# Patient Record
Sex: FEMALE | HISPANIC OR LATINO | Employment: UNEMPLOYED | URBAN - METROPOLITAN AREA
[De-identification: names, ages, dates, MRNs, and addresses within clinical notes are randomized per-mention and may not be internally consistent; named-entity substitution may affect disease eponyms.]

---

## 2024-07-09 ENCOUNTER — MEDICAL CORRESPONDENCE (OUTPATIENT)
Dept: HEALTH INFORMATION MANAGEMENT | Facility: CLINIC | Age: 24
End: 2024-07-09
Payer: MEDICAID

## 2024-07-16 DIAGNOSIS — Z32.01 PREGNANCY TEST POSITIVE: Primary | ICD-10-CM

## 2024-07-17 ENCOUNTER — ANCILLARY PROCEDURE (OUTPATIENT)
Dept: ULTRASOUND IMAGING | Facility: CLINIC | Age: 24
End: 2024-07-17
Attending: MIDWIFE
Payer: MEDICAID

## 2024-07-17 DIAGNOSIS — Z32.01 PREGNANCY TEST POSITIVE: ICD-10-CM

## 2024-07-17 PROCEDURE — 76801 OB US < 14 WKS SINGLE FETUS: CPT | Performed by: RADIOLOGY

## 2024-07-17 PROCEDURE — T1013 SIGN LANG/ORAL INTERPRETER: HCPCS | Mod: U4

## 2024-07-17 NOTE — NURSING NOTE
Due to patient being non-English speaking/uses sign language, an  was used for this visit. Only for face-to-face interpretation by an external agency, date and length of interpretation can be found on the scanned worksheet.     name: Heike  Agency:  [employer]  Language: Armenian   Telephone number: [phone]  Type of interpretation: Telephone, spoken

## 2024-09-12 ENCOUNTER — HOSPITAL ENCOUNTER (OUTPATIENT)
Dept: ULTRASOUND IMAGING | Facility: CLINIC | Age: 24
Discharge: HOME | End: 2024-09-12
Attending: FAMILY MEDICINE | Admitting: FAMILY MEDICINE
Payer: MEDICAID

## 2024-09-12 DIAGNOSIS — Z34.82 SUPERVISION OF NORMAL INTRAUTERINE PREGNANCY IN MULTIGRAVIDA, SECOND TRIMESTER: ICD-10-CM

## 2024-09-12 PROCEDURE — 76805 OB US >/= 14 WKS SNGL FETUS: CPT

## 2024-09-12 PROCEDURE — 76805 OB US >/= 14 WKS SNGL FETUS: CPT | Mod: 26 | Performed by: RADIOLOGY

## 2024-12-30 ENCOUNTER — HOSPITAL ENCOUNTER (OUTPATIENT)
Facility: CLINIC | Age: 24
Discharge: HOME | End: 2024-12-30
Attending: NURSE PRACTITIONER | Admitting: NURSE PRACTITIONER
Payer: COMMERCIAL

## 2024-12-30 VITALS
SYSTOLIC BLOOD PRESSURE: 106 MMHG | HEART RATE: 81 BPM | RESPIRATION RATE: 18 BRPM | HEIGHT: 58 IN | BODY MASS INDEX: 32.12 KG/M2 | DIASTOLIC BLOOD PRESSURE: 69 MMHG | TEMPERATURE: 98.4 F | WEIGHT: 153 LBS

## 2024-12-30 PROBLEM — Z36.89 ENCOUNTER FOR TRIAGE IN PREGNANT PATIENT: Status: ACTIVE | Noted: 2024-12-30

## 2024-12-30 PROCEDURE — 59025 FETAL NON-STRESS TEST: CPT | Mod: 26 | Performed by: NURSE PRACTITIONER

## 2024-12-30 PROCEDURE — 99214 OFFICE O/P EST MOD 30 MIN: CPT | Mod: 25 | Performed by: NURSE PRACTITIONER

## 2024-12-30 PROCEDURE — G0463 HOSPITAL OUTPT CLINIC VISIT: HCPCS

## 2024-12-30 RX ORDER — LIDOCAINE 40 MG/G
CREAM TOPICAL
Status: DISCONTINUED | OUTPATIENT
Start: 2024-12-30 | End: 2024-12-30 | Stop reason: HOSPADM

## 2024-12-30 RX ORDER — FERROUS GLUCONATE 324(38)MG
324 TABLET ORAL
COMMUNITY

## 2024-12-30 ASSESSMENT — ACTIVITIES OF DAILY LIVING (ADL)
ADLS_ACUITY_SCORE: 15

## 2024-12-30 NOTE — PROGRESS NOTES
HOSPITAL TRIAGE NOTE  ===================    CHIEF COMPLAINT  ========================  Elzbieta Gooden is a 24 year old patient presenting today at 37w1d for evaluation of uterine contractions.    Patient's last menstrual period was 2024.  Estimated Date of Delivery: 2025       HPI  ==================   Elzbieta started contractions this morning. She lives 10 minutes away and plans an unmedicated birth. Has had prenatal care through [facility].   Prenatal record and labs reviewed from [facility], through [employer] EMR.    CONTRACTIONS: every 6 minutes  ABDOMINAL PAIN: none and mild  FETAL MOVEMENT: active    VAGINAL BLEEDING: none  RUPTURE OF MEMBRANES: no  PELVIC PAIN: none    PREGNANCY COMPLICATIONS: none      # Pain Assessment:      2024     8:55 AM   Current Pain Score   Patient currently in pain? yes   - Elzbieta is experiencing pain due to contractions. Pain management was discussed and the plan was created in a collaborative fashion.  Elzbieta's response to the current recommendations: engaged  - expectant managment      REVIEW OF SYSTEMS  =====================  C: NEGATIVE for fever, chills  I: NEGATIVE for worrisome rashes, moles or lesions  E: NEGATIVE for vision changes or irritation  R: NEGATIVE for significant cough or SOB  CV: NEGATIVE for chest pain, palpitations or varicosities  GI: NEGATIVE for nausea, abdominal pain, heartburn, or change in bowel habits  : NEGATIVE for frequency, dysuria, or hematuria  M: NEGATIVE for significant arthralgias or myalgia  N: NEGATIVE for headache, weakness, dizziness or paresthesias  P: NEGATIVE for changes in mood or affect    PROBLEM LIST  ===============  Patient Active Problem List    Diagnosis Date Noted    Encounter for triage in pregnant patient 2024     Priority: Medium       HISTORIES  ==============  ALLERGIES:    No Known Allergies  PAST MEDICAL HISTORY  History reviewed. No pertinent past medical  history.  SOCIAL HISTORY  Social History     Socioeconomic History    Marital status:      Spouse name: Not on file    Number of children: Not on file    Years of education: Not on file    Highest education level: Not on file   Occupational History    Not on file   Tobacco Use    Smoking status: Never    Smokeless tobacco: Never   Substance and Sexual Activity    Alcohol use: Never    Drug use: Never    Sexual activity: Yes   Other Topics Concern    Not on file   Social History Narrative    Not on file     Social Drivers of Health     Financial Resource Strain: Low Risk  (2024)    Financial Resource Strain     Within the past 12 months, have you or your family members you live with been unable to get utilities (heat, electricity) when it was really needed?: No   Food Insecurity: Low Risk  (2024)    Food Insecurity     Within the past 12 months, did you worry that your food would run out before you got money to buy more?: No     Within the past 12 months, did the food you bought just not last and you didn t have money to get more?: No   Transportation Needs: Low Risk  (2024)    Transportation Needs     Within the past 12 months, has lack of transportation kept you from medical appointments, getting your medicines, non-medical meetings or appointments, work, or from getting things that you need?: No   Physical Activity: Not on File (2024)    Received from [facility]    Physical Activity     Physical Activity: 0   Stress: Not on File (2024)    Received from [facility]    Stress     Stress: 0   Social Connections: Not on File (2024)    Received from [facility]    Social Connections     Connectedness: 0   Interpersonal Safety: Not on file   Housing Stability: Low Risk  (2024)    Housing Stability     Do you have housing? : Yes     Are you worried about losing your housing?: No       FAMILY HISTORY  History reviewed. No pertinent family history.  OB HISTORY  OB History    Para Term  " AB Living   2 1 1 0 0 1   SAB IAB Ectopic Multiple Live Births   0 0 0 0 1      # Outcome Date GA Lbr Abbe/2nd Weight Sex Type Anes PTL Lv   2 Current            1 Term 18     Vag-Spont   TC     Prenatal Labs: O pos, antibody neg, Hep B neg, Hep C neg, HIV NR, RPR NR  Rubella- immune  Varicella immune    ULTRASOUND(s) reviewed: 24 21w4d, normal survey.     EXAM  ============  /69 (BP Location: Right arm)   Pulse 81   Temp 98.4  F (36.9  C) (Oral)   Resp 18   Ht 1.47 m (4' 9.87\")   Wt 69.4 kg (153 lb)   LMP 2024   BMI 32.12 kg/m    GENERAL APPEARANCE: healthy, alert and no distress  RESP: lungs clear to auscultation - no rales, rhonchi or wheezes  CV: regular rates and rhythm, normal S1 S2, no S3 or S4 and no murmur,and no varicosities  ABDOMEN:  soft, nontender, no epigastric pain  SKIN: no suspicious lesions or rashes  NEURO: Denies headache, blurred vision, other vision changes  PSYCH: mentation appears normal. and affect normal/bright  MS/ LEGS: Reflexes normal bilaterally    CONTRACTIONS: every 6-8 minutes   FETAL HEART TONES: continuous EFM- baseline 135 with moderate variability and positive accelerations. No decelerations.  NST: REACTIVE  EFW:7#    PELVIC EXAM: 1/ 50%/ Posterior/ med/ -3  boggs = 3  PRESENTATION: VERTEX by bsus  BLOOD: no  DISCHARGE: none    ROM: no      DIAGNOSIS  ============  37w1d seen on the Birthplace Triage for labor evaluation- prodromal labor    NST: REACTIVE  Fetal Heart rate tracing:category one    PLAN  ============  See and evaluated on L&D. No cervical change over 2 hours.   Discharge to home with labor instuctions per discharge instruction form  Call or return to the Birthplace with contractions, cramping, abdominal or pelvic pain, vaginal bleeding, leaking fluid or decreased fetal movement.  Follow up- as previously scheduled    FREDI SALEHM    "

## 2024-12-30 NOTE — PROGRESS NOTES
Data: Patient assessed in the Birthplace for uterine contractions. Cervix 1 cm dilated and 50% effaced. Fetal station -3. Membranes intact. Contractions are  Uterine assessment is mild by palpation during contractions and soft by palpation at rest. See flowsheets for fetal assessment documentation.     Action:  Presumed adequate fetal oxygenation documented. Early labor precautions and discharge instructions reviewed, including when to notify provider if warning signs present. Patient instructed to report decrease in fetal movement, vaginal bleeding, changes in membrane status, abdominal pain, or any concerns related to the pregnancy to patient's provider/clinic.     Response: Orders to discharge home per myleen swanson cnm. Plan for patient is to re-evaluate labor status by following up with provider as needed. Patient verbalized understanding of education and agreement with plan. Discharged to home at 1130.

## 2024-12-30 NOTE — PLAN OF CARE
A Grant SNELLM notified of pt arrival. Pt arrived for rule out labor. 37.1 . Ctx 2-5 min apart. Has been johnie since 10 pm last night

## 2025-01-17 ENCOUNTER — APPOINTMENT (OUTPATIENT)
Dept: INTERPRETER SERVICES | Facility: CLINIC | Age: 25
End: 2025-01-17
Payer: COMMERCIAL

## 2025-01-17 ENCOUNTER — HOSPITAL ENCOUNTER (INPATIENT)
Facility: CLINIC | Age: 25
LOS: 1 days | Discharge: HOME | End: 2025-01-18
Attending: REGISTERED NURSE | Admitting: REGISTERED NURSE
Payer: COMMERCIAL

## 2025-01-17 PROBLEM — Z34.83 ENCOUNTER FOR SUPERVISION OF OTHER NORMAL PREGNANCY IN THIRD TRIMESTER: Status: ACTIVE | Noted: 2025-01-17

## 2025-01-17 PROBLEM — D64.9 ANEMIA: Status: ACTIVE | Noted: 2024-09-12

## 2025-01-17 PROBLEM — Z87.59 HISTORY OF POSTPARTUM HEMORRHAGE: Status: ACTIVE | Noted: 2025-01-17

## 2025-01-17 LAB
ABO + RH BLD: NORMAL
BLD GP AB SCN SERPL QL: NEGATIVE
ERYTHROCYTE [DISTWIDTH] IN BLOOD BY AUTOMATED COUNT: 16 % (ref 10–15)
HCT VFR BLD AUTO: 33.4 % (ref 35–47)
HGB BLD-MCNC: 11.2 G/DL (ref 11.7–15.7)
MCH RBC QN AUTO: 29.8 PG (ref 26.5–33)
MCHC RBC AUTO-ENTMCNC: 33.5 G/DL (ref 31.5–36.5)
MCV RBC AUTO: 89 FL (ref 78–100)
PLATELET # BLD AUTO: 240 10E3/UL (ref 150–450)
RBC # BLD AUTO: 3.76 10E6/UL (ref 3.8–5.2)
SPECIMEN EXP DATE BLD: NORMAL
T PALLIDUM AB SER QL: NONREACTIVE
WBC # BLD AUTO: 8.2 10E3/UL (ref 4–11)

## 2025-01-17 PROCEDURE — 120N000002 HC R&B MED SURG/OB UMMC

## 2025-01-17 PROCEDURE — 86901 BLOOD TYPING SEROLOGIC RH(D): CPT | Performed by: REGISTERED NURSE

## 2025-01-17 PROCEDURE — 250N000013 HC RX MED GY IP 250 OP 250 PS 637: Performed by: REGISTERED NURSE

## 2025-01-17 PROCEDURE — 86780 TREPONEMA PALLIDUM: CPT | Performed by: REGISTERED NURSE

## 2025-01-17 PROCEDURE — 85048 AUTOMATED LEUKOCYTE COUNT: CPT | Performed by: REGISTERED NURSE

## 2025-01-17 PROCEDURE — 250N000011 HC RX IP 250 OP 636: Performed by: REGISTERED NURSE

## 2025-01-17 PROCEDURE — 86900 BLOOD TYPING SEROLOGIC ABO: CPT | Performed by: REGISTERED NURSE

## 2025-01-17 PROCEDURE — G0463 HOSPITAL OUTPT CLINIC VISIT: HCPCS

## 2025-01-17 PROCEDURE — 722N000001 HC LABOR CARE VAGINAL DELIVERY SINGLE

## 2025-01-17 PROCEDURE — 250N000009 HC RX 250: Performed by: REGISTERED NURSE

## 2025-01-17 PROCEDURE — 36415 COLL VENOUS BLD VENIPUNCTURE: CPT | Performed by: REGISTERED NURSE

## 2025-01-17 PROCEDURE — 85014 HEMATOCRIT: CPT | Performed by: REGISTERED NURSE

## 2025-01-17 PROCEDURE — 59409 OBSTETRICAL CARE: CPT | Performed by: MIDWIFE

## 2025-01-17 RX ORDER — IBUPROFEN 800 MG/1
800 TABLET, FILM COATED ORAL
Status: DISCONTINUED | OUTPATIENT
Start: 2025-01-17 | End: 2025-01-18 | Stop reason: HOSPADM

## 2025-01-17 RX ORDER — CARBOPROST TROMETHAMINE 250 UG/ML
250 INJECTION, SOLUTION INTRAMUSCULAR
Status: DISCONTINUED | OUTPATIENT
Start: 2025-01-17 | End: 2025-01-18 | Stop reason: HOSPADM

## 2025-01-17 RX ORDER — TRANEXAMIC ACID 10 MG/ML
1 INJECTION, SOLUTION INTRAVENOUS EVERY 30 MIN PRN
Status: DISCONTINUED | OUTPATIENT
Start: 2025-01-17 | End: 2025-01-17 | Stop reason: HOSPADM

## 2025-01-17 RX ORDER — LOPERAMIDE HYDROCHLORIDE 2 MG/1
4 CAPSULE ORAL
Status: DISCONTINUED | OUTPATIENT
Start: 2025-01-17 | End: 2025-01-17 | Stop reason: HOSPADM

## 2025-01-17 RX ORDER — FENTANYL CITRATE 50 UG/ML
100 INJECTION, SOLUTION INTRAMUSCULAR; INTRAVENOUS
Status: DISCONTINUED | OUTPATIENT
Start: 2025-01-17 | End: 2025-01-17 | Stop reason: HOSPADM

## 2025-01-17 RX ORDER — METHYLERGONOVINE MALEATE 0.2 MG/ML
200 INJECTION INTRAVENOUS
Status: DISCONTINUED | OUTPATIENT
Start: 2025-01-17 | End: 2025-01-17 | Stop reason: HOSPADM

## 2025-01-17 RX ORDER — HYDROCORTISONE 25 MG/G
CREAM TOPICAL 3 TIMES DAILY PRN
Status: DISCONTINUED | OUTPATIENT
Start: 2025-01-17 | End: 2025-01-18 | Stop reason: HOSPADM

## 2025-01-17 RX ORDER — MISOPROSTOL 200 UG/1
400 TABLET ORAL
Status: DISCONTINUED | OUTPATIENT
Start: 2025-01-17 | End: 2025-01-18 | Stop reason: HOSPADM

## 2025-01-17 RX ORDER — OXYTOCIN/0.9 % SODIUM CHLORIDE 30/500 ML
340 PLASTIC BAG, INJECTION (ML) INTRAVENOUS CONTINUOUS PRN
Status: DISCONTINUED | OUTPATIENT
Start: 2025-01-17 | End: 2025-01-18 | Stop reason: HOSPADM

## 2025-01-17 RX ORDER — METHYLERGONOVINE MALEATE 0.2 MG/ML
200 INJECTION INTRAVENOUS
Status: DISCONTINUED | OUTPATIENT
Start: 2025-01-17 | End: 2025-01-18 | Stop reason: HOSPADM

## 2025-01-17 RX ORDER — MISOPROSTOL 200 UG/1
800 TABLET ORAL
Status: DISCONTINUED | OUTPATIENT
Start: 2025-01-17 | End: 2025-01-17 | Stop reason: HOSPADM

## 2025-01-17 RX ORDER — OXYTOCIN/0.9 % SODIUM CHLORIDE 30/500 ML
PLASTIC BAG, INJECTION (ML) INTRAVENOUS
Status: COMPLETED
Start: 2025-01-17 | End: 2025-01-17

## 2025-01-17 RX ORDER — LIDOCAINE HYDROCHLORIDE 10 MG/ML
INJECTION, SOLUTION EPIDURAL; INFILTRATION; INTRACAUDAL; PERINEURAL
Status: DISCONTINUED
Start: 2025-01-17 | End: 2025-01-17 | Stop reason: HOSPADM

## 2025-01-17 RX ORDER — MISOPROSTOL 200 UG/1
400 TABLET ORAL
Status: DISCONTINUED | OUTPATIENT
Start: 2025-01-17 | End: 2025-01-17 | Stop reason: HOSPADM

## 2025-01-17 RX ORDER — BISACODYL 10 MG
10 SUPPOSITORY, RECTAL RECTAL DAILY PRN
Status: DISCONTINUED | OUTPATIENT
Start: 2025-01-17 | End: 2025-01-18 | Stop reason: HOSPADM

## 2025-01-17 RX ORDER — NALOXONE HYDROCHLORIDE 0.4 MG/ML
0.2 INJECTION, SOLUTION INTRAMUSCULAR; INTRAVENOUS; SUBCUTANEOUS
Status: DISCONTINUED | OUTPATIENT
Start: 2025-01-17 | End: 2025-01-17 | Stop reason: HOSPADM

## 2025-01-17 RX ORDER — NALOXONE HYDROCHLORIDE 0.4 MG/ML
0.4 INJECTION, SOLUTION INTRAMUSCULAR; INTRAVENOUS; SUBCUTANEOUS
Status: DISCONTINUED | OUTPATIENT
Start: 2025-01-17 | End: 2025-01-17 | Stop reason: HOSPADM

## 2025-01-17 RX ORDER — ONDANSETRON 2 MG/ML
4 INJECTION INTRAMUSCULAR; INTRAVENOUS EVERY 6 HOURS PRN
Status: DISCONTINUED | OUTPATIENT
Start: 2025-01-17 | End: 2025-01-17 | Stop reason: HOSPADM

## 2025-01-17 RX ORDER — TRANEXAMIC ACID 10 MG/ML
1 INJECTION, SOLUTION INTRAVENOUS EVERY 30 MIN PRN
Status: DISCONTINUED | OUTPATIENT
Start: 2025-01-17 | End: 2025-01-18 | Stop reason: HOSPADM

## 2025-01-17 RX ORDER — OXYTOCIN/0.9 % SODIUM CHLORIDE 30/500 ML
100-340 PLASTIC BAG, INJECTION (ML) INTRAVENOUS CONTINUOUS PRN
Status: DISCONTINUED | OUTPATIENT
Start: 2025-01-17 | End: 2025-01-18 | Stop reason: HOSPADM

## 2025-01-17 RX ORDER — OXYTOCIN 10 [USP'U]/ML
10 INJECTION, SOLUTION INTRAMUSCULAR; INTRAVENOUS
Status: DISCONTINUED | OUTPATIENT
Start: 2025-01-17 | End: 2025-01-17 | Stop reason: HOSPADM

## 2025-01-17 RX ORDER — MISOPROSTOL 200 UG/1
TABLET ORAL
Status: COMPLETED
Start: 2025-01-17 | End: 2025-01-17

## 2025-01-17 RX ORDER — LOPERAMIDE HYDROCHLORIDE 2 MG/1
2 CAPSULE ORAL
Status: DISCONTINUED | OUTPATIENT
Start: 2025-01-17 | End: 2025-01-18 | Stop reason: HOSPADM

## 2025-01-17 RX ORDER — LOPERAMIDE HYDROCHLORIDE 2 MG/1
4 CAPSULE ORAL
Status: DISCONTINUED | OUTPATIENT
Start: 2025-01-17 | End: 2025-01-18 | Stop reason: HOSPADM

## 2025-01-17 RX ORDER — KETOROLAC TROMETHAMINE 30 MG/ML
30 INJECTION, SOLUTION INTRAMUSCULAR; INTRAVENOUS
Status: DISCONTINUED | OUTPATIENT
Start: 2025-01-17 | End: 2025-01-18 | Stop reason: HOSPADM

## 2025-01-17 RX ORDER — MODIFIED LANOLIN
OINTMENT (GRAM) TOPICAL
Status: DISCONTINUED | OUTPATIENT
Start: 2025-01-17 | End: 2025-01-18 | Stop reason: HOSPADM

## 2025-01-17 RX ORDER — PROCHLORPERAZINE MALEATE 10 MG
10 TABLET ORAL EVERY 6 HOURS PRN
Status: DISCONTINUED | OUTPATIENT
Start: 2025-01-17 | End: 2025-01-17 | Stop reason: HOSPADM

## 2025-01-17 RX ORDER — OXYTOCIN 10 [USP'U]/ML
10 INJECTION, SOLUTION INTRAMUSCULAR; INTRAVENOUS
Status: DISCONTINUED | OUTPATIENT
Start: 2025-01-17 | End: 2025-01-18 | Stop reason: HOSPADM

## 2025-01-17 RX ORDER — OXYTOCIN 10 [USP'U]/ML
INJECTION, SOLUTION INTRAMUSCULAR; INTRAVENOUS
Status: DISCONTINUED
Start: 2025-01-17 | End: 2025-01-17 | Stop reason: HOSPADM

## 2025-01-17 RX ORDER — METOCLOPRAMIDE 10 MG/1
10 TABLET ORAL EVERY 6 HOURS PRN
Status: DISCONTINUED | OUTPATIENT
Start: 2025-01-17 | End: 2025-01-17 | Stop reason: HOSPADM

## 2025-01-17 RX ORDER — OXYTOCIN/0.9 % SODIUM CHLORIDE 30/500 ML
340 PLASTIC BAG, INJECTION (ML) INTRAVENOUS CONTINUOUS PRN
Status: DISCONTINUED | OUTPATIENT
Start: 2025-01-17 | End: 2025-01-17 | Stop reason: HOSPADM

## 2025-01-17 RX ORDER — CARBOPROST TROMETHAMINE 250 UG/ML
250 INJECTION, SOLUTION INTRAMUSCULAR
Status: DISCONTINUED | OUTPATIENT
Start: 2025-01-17 | End: 2025-01-17 | Stop reason: HOSPADM

## 2025-01-17 RX ORDER — ACETAMINOPHEN 325 MG/1
650 TABLET ORAL EVERY 4 HOURS PRN
Status: DISCONTINUED | OUTPATIENT
Start: 2025-01-17 | End: 2025-01-18 | Stop reason: HOSPADM

## 2025-01-17 RX ORDER — IBUPROFEN 800 MG/1
800 TABLET, FILM COATED ORAL EVERY 6 HOURS PRN
Status: DISCONTINUED | OUTPATIENT
Start: 2025-01-17 | End: 2025-01-18 | Stop reason: HOSPADM

## 2025-01-17 RX ORDER — ONDANSETRON 4 MG/1
4 TABLET, ORALLY DISINTEGRATING ORAL EVERY 6 HOURS PRN
Status: DISCONTINUED | OUTPATIENT
Start: 2025-01-17 | End: 2025-01-17 | Stop reason: HOSPADM

## 2025-01-17 RX ORDER — DOCUSATE SODIUM 100 MG/1
100 CAPSULE, LIQUID FILLED ORAL DAILY
Status: DISCONTINUED | OUTPATIENT
Start: 2025-01-17 | End: 2025-01-18 | Stop reason: HOSPADM

## 2025-01-17 RX ORDER — CITRIC ACID/SODIUM CITRATE 334-500MG
30 SOLUTION, ORAL ORAL
Status: DISCONTINUED | OUTPATIENT
Start: 2025-01-17 | End: 2025-01-17 | Stop reason: HOSPADM

## 2025-01-17 RX ORDER — METOCLOPRAMIDE HYDROCHLORIDE 5 MG/ML
10 INJECTION INTRAMUSCULAR; INTRAVENOUS EVERY 6 HOURS PRN
Status: DISCONTINUED | OUTPATIENT
Start: 2025-01-17 | End: 2025-01-17 | Stop reason: HOSPADM

## 2025-01-17 RX ORDER — LOPERAMIDE HYDROCHLORIDE 2 MG/1
2 CAPSULE ORAL
Status: DISCONTINUED | OUTPATIENT
Start: 2025-01-17 | End: 2025-01-17 | Stop reason: HOSPADM

## 2025-01-17 RX ORDER — MISOPROSTOL 200 UG/1
800 TABLET ORAL
Status: DISCONTINUED | OUTPATIENT
Start: 2025-01-17 | End: 2025-01-18 | Stop reason: HOSPADM

## 2025-01-17 RX ADMIN — MISOPROSTOL 800 MCG: 200 TABLET ORAL at 12:33

## 2025-01-17 RX ADMIN — Medication 340 ML/HR: at 12:34

## 2025-01-17 RX ADMIN — METHYLERGONOVINE MALEATE 200 MCG: 0.2 INJECTION INTRAVENOUS at 12:34

## 2025-01-17 ASSESSMENT — ACTIVITIES OF DAILY LIVING (ADL)
ADLS_ACUITY_SCORE: 21
ADLS_ACUITY_SCORE: 20
ADLS_ACUITY_SCORE: 21
ADLS_ACUITY_SCORE: 41
ADLS_ACUITY_SCORE: 20
ADLS_ACUITY_SCORE: 41
ADLS_ACUITY_SCORE: 20
ADLS_ACUITY_SCORE: 21
ADLS_ACUITY_SCORE: 20

## 2025-01-17 NOTE — PROGRESS NOTES
Called for Decels at 1218, arrived at shortly therafter. Patient  upon arrival, being attended by Shilpa Yañez CNM. Head delivered easily, body delivered after repositioning into Brian. After infant was placed on mother's abdomen, I returned all cares to Shilpa, approximately 5 minutes in total.     Isha Jose MD, MSCI, FACOG    [facility]  2025

## 2025-01-17 NOTE — PROGRESS NOTES
Summary:  Elzbieta arrived into room 7137 at 1523 holding NB - accompanied by MARCOS RN and s/o Parish and .  NB ID band check completed.  Phone report received just prior to transfer.  Per report, h/o PPH.  Given methergine, oxytocin, cytotec.  Voided at 1500.  Breast feeding.  Orientated to room/routines.  Given written material/forms in Korean.  Reviewed prn pain meds - declines meds at this time.  Reviewed comfort items & made available.  Dinner ordered.  Ipad at bedside.  Call light is within reach.

## 2025-01-17 NOTE — PROVIDER NOTIFICATION
01/17/25 1727   Provider Notification   Provider Name/Title Shilpa Toribio CNM   Method of Notification Electronic Page     Asked to discharge intrapartum and enter postpartum orders.

## 2025-01-17 NOTE — L&D DELIVERY NOTE
Delivery Summary       Delivery Course:  Elzbieta Gooden is a 24 year old   at  39w5d presented to labor floor with c/o SROM at 04 and contractions .    Brief labor course:   Pt admitted in early labor 0615 2/50/-2     SROM 0400  0945 PELVIC EXAM: 4.5/ 80%/ Posterior/ soft/ -2    1105 6/90/-1   1207 9cm and pushy     1209 ant lip reduced easily with pt pushing   1214 FHR 80 noted   Dr Jose called to come  CNM called for NICU attendance at birth   1216 FHR back to 120s pt pushing well 1218 G3 in room 1220 Dr Jose in room   Pt pushing well    IUP at 39 weeks gestation delivered on 2025.    Labor was spontaneous.  Medications administered  in labor:  Pain Rx Nitrous oxide ; Antibiotics No;   25  0400 date/time of rupture: Progressed to complete 1209  .  Pushed effectively . FHR cat 2   Head delivered,. 12:21. 25 sec,   shoulder dystocia noted with ant flexion   MD in room    Pt placed in Brian with suprapubic  pressure  shoulders delivered with ant traction   no nuchal cord.    35 sec from head to baby delivery    birth of a viable Female infant at 1222      Spontaneous breath, vigorous cry, well flexed, HR>100. Infant directly to maternal abdomen, skin to skin.   Delayed cord clamping for 2 minutes then clamped x2 and cut by CUCO Lugo .    Cord blood obtained for typing.  Cord segment for gases.    Apgars of 8 at 1 minute and 9 at 5 minutes.  Placenta-mechanism: spontaneous, intact,  with a 3 vessel cord. IV oxytocin was given. at 1226.   .   Fundus firm @ U/2 after IV pitocin opened after delivery of infant,  Methergine 0.2 mg IM given followed by miso 800 mcg rectal following a small gush   Fundal massage no further clots or heavy bleeding .     Vagina, perineum, and rectum inspected, Perineum: Intact.        Mother and infant stable, continued skin to skin.  Myrtle Weight : 8 pounds 1 ounces   QBLs was 350.  Complications of pregnancy, labor and delivery: hx  PPH per pt   Anticipated d/c date: 1/18/25  Birth attendants:FREDI Villanueva CNM  Medically Ready for Discharge: Anticipated Tomorrow       Elzbieta Gooden MRN# [id number]   Age: 24 year old YOB: 2000     ASSESSMENT & PLAN: routine PP care        Giacomo Gooden, Female-Elzbieta [[id number]]      Labor Event Times      Latent labor onset date/time: 1/17/2025 0400    Active labor onset date: 1/17/25 Onset time: 11:05 AM   Dilation complete date: 1/17/25 Complete time: 12:09 PM   Start pushing date/time: 1/17/2025 1209          Rupture date/time: 1/17/25 0400   Rupture type: Spontaneous Rupture of Membranes  Fluid color: Bloody  Fluid odor: Normal     Augmentation: None       Delivery/Placenta Date and Time      Delivery Date: 1/17/25 Delivery Time: 12:22 PM   Placenta Date/Time: 1/17/2025 12:26 PM  Oxytocin given at the time of delivery: after delivery of baby  Delivering clinician: Katherin Yañez APRN CNM   Other personnel present at delivery:  Provider Role   Isha Jose MD Delsman, Arianna, MD              Vaginal Counts       Initial count performed by 2 team members:  Two Team Members   Shilpa RAI         Needles Suture Needles Sponges (RETIRED) Instruments   Initial counts 2  5    Added to count       Relief counts       Final counts 2  5            Placed during labor Accounted for at the end of labor   FSE No    IUPC No    Cervidil No                   Final count performed by 2 team members:  Two Team Members   Shilpa Yañez CNM APRN      Final count correct?: Yes  Pre-Birth Team Brief: Complete  Post-Birth Team Debrief: Complete       Apgars    Living status: Living   1 Minute 5 Minute 10 Minute 15 Minute 20 Minute   Skin color:        Heart rate:        Reflex irritability:        Muscle tone:        Respiratory effort:        Total:               Cord      Vessels: 3 Vessels    Cord Complications: None               Cord Blood Disposition:  Discard    Gases Sent?: No    Delayed cord clamping?: Yes    Cord Clamping Delay (seconds):  seconds    Stem cell collection?: No            Resuscitation    Methods: None  [place] Care at Delivery: Baby placed on mom's abdomen vigorous stim warm blankets spontaneous cry        Labor Events and Shoulder Dystocia    Fetal Tracing Prior to Delivery: Category 2  Fetal Tracing Comments: bradycardia x 2 min returned to 120 bpm  Shoulder dystocia present?: Pos  Anterior shoulder: left Time body delivered: 1222 CST   Time head delivered: 1221 CST Help called by: Shilpa Yañez MD in room prior at 1220   Time recognized: 2025 1221    Time help called: 2025 1221 Physician/Provider: Isha Jose   Physician/Provider arrived: 2025 1220    Gentle attempt at traction, assisted by maternal expulsive forces?: Yes           First Maneuver: Brian maneuver, Suprapubic pressure    Time performed: 2025 1221    Performed by: Shilpa RAI           Delivery (Maternal) (Provider to Complete) ([id number])    Episiotomy: None  Perineal lacerations: None    Repair suture: None  Genital tract inspection done: Pos       Blood Loss  Mother: Elzbieta Canales #[id number]     Start of Mother's Information      Delivery Blood Loss   Intrapartum & Postpartum: 25 1105 - 25 1328    Delivery Admission: 25 0501 - 25 1328         Intrapartum & Postpartum Delivery Admission    None                  End of Mother's Information  Mother: Elzbieta Canales #[id number]                Delivery - Provider to Complete ([id number])    Delivering clinician: Katherin Yañez APRN CNM  Delivery Type (Choose the 1 that will go to the Birth History): Vaginal, Spontaneous                         Other personnel:  Provider Role   Isha Jose MD Delsman, Arianna, MD                     Placenta    Date/Time: 2025 12:26 PM  Removal:  Spontaneous  Disposition: Hospital disposal             Anesthesia    Method: Nitrous Oxide                    Presentation and Position    Presentation: Vertex    Position: Left Occiput Anterior                     FREDI Villanueva CNM

## 2025-01-17 NOTE — PLAN OF CARE
Goal Outcome Evaluation:       Patient more uncomfortable, swaying and breathing through contractions. Per CNM, SVE 4.5/80%/-2. Encouraged use of hydrotherapy for pain management. Patient now laboring in tub with support of partner.

## 2025-01-17 NOTE — PROGRESS NOTES
Labor Progress Note     SUBJECTIVE:  ==============  Elzbieta Gooden  Estimated Date of Delivery: 2025  General appearance: uncomfortable with contractions  Support: Parish   Changing positions upright mobile       OBJECTIVE:  ==============  VITALS  Blood pressure 113/55, temperature 98.8  F (37.1  C), temperature source Oral, resp. rate 16, last menstrual period 2024.  Patient Vitals for the past 24 hrs:   BP Temp Temp src Resp   25 0517 113/55 98.8  F (37.1  C) Oral 16       FETAL HEART RATE ASSESSMENT:  Baseline rate 135, normal  Variability moderate  Accelerations present   Decelerations not present    CONTRACTIONS: Contractions every 3-5 minutes.  Palpate: moderate  Pitocin- none,  Antibiotics- none  GBS unknown      ROM: clear fluid at 0400  PELVIC EXAM:deferred    # Pain Assessment:      2025     6:34 AM   Current Pain Score   Patient currently in pain? yes   - Elzbieta is experiencing pain due to labor planning unmedicated . Pain management was discussed and the plan was created in a collaborative fashion.  Elzbieta's response to the current recommendations: engaged      FETAL HEART RATE ASSESSMENT:  Reviewed fetal monitoring strip remotely via [employer]  EFM interpretation suggests: absence of concern for metabolic acidemia due to: presence of accelerations and moderate variability. EFM suggests no concern for interruption of the oxygen pathway..      ASSESSMENT:  ==============  Elzbieta Gooden  24 year old  female  Estimated Date of Delivery: 2025  IUP @ 39w5d in early labor   Fetal Heart Rate Tracing category one over the last 60 minutes  GBS- not done    Patient Active Problem List   Diagnosis    Encounter for triage in pregnant patient    Anemia    Encounter for supervision of other normal pregnancy in third trimester    History of postpartum hemorrhage    Labor and delivery indication for care or intervention           PLAN:  ===========  - Anticipate progress and NSVB.   - Reevaluate progress in 2 hours or sooner with a change in status.  - Encouraged frequent position changes to facilitate labor and fetal descent.  - plan IA at this time        Clinically Significant Risk Factors Present on Admission                                      Medically Ready for Discharge: Anticipated in 2-4 Days     FREDI Villanueva CNM

## 2025-01-17 NOTE — PROGRESS NOTES
Labor Progress Note   An  was present  In Person throughout the visit today. After discussing my assessment and treatment recommendations including risks and benefits with the patient, and family.  I asked the patient, and family to convey what they understood about my assessment and recommendations to ensure understanding. The communication back from the patient, and family, as relayed through the , confirmed understanding. Thepatient, and family was also given time to have all questions answered.   name: Ralf,  ID number: [id number]   SUBJECTIVE:  ==============  Elzbieta Uribe Gooden [facility]  Estimated Date of Delivery: 2025  General appearance: uncomfortable with contractions  Support: Parish  Pt desires to try nitrous oxide for painful contractions   Continues to note pressure       OBJECTIVE:  ==============  VITALS  Blood pressure 116/63, pulse 82, temperature 98.1  F (36.7  C), temperature source Oral, resp. rate 18, last menstrual period 2024.  Patient Vitals for the past 24 hrs:   BP Temp Temp src Pulse Resp   25 1030 -- 98.1  F (36.7  C) Oral -- --   25 0900 116/63 98.2  F (36.8  C) Oral 82 18   25 0517 113/55 98.8  F (37.1  C) Oral -- 16     Blood pressure 116/63, pulse 82, temperature 98.1  F (36.7  C), temperature source Oral, resp. rate 18, last menstrual period 2024.    IA: (pt meets criteria and desires IA monitoring):  Baseline rate: 140  Regular heart rate rhythm  Increases in fetal heart rate Present  Decreases in fetal heart rate Not Present  Interventions performed: No interventions needed at this time   Uterine activity: Contraction frequency 2-3, length 60-80 sec, intensity strong, resting time 60 sec,palpate soft between contractions    FHTs auscultated by nurse while provider at bedside      ROM: clear fluid  PELVIC EXAM:PELVIC EXAM: 6/ 90%/ Anterior/ soft/ -1   # Pain Assessment:      2025     11:45 AM   Current Pain Score   Patient currently in pain? yes   Reviewed nitrous oxide,  IV fentanyl, epidural  pt opted for nitrous oxide         Labor course:  Pt admitted in early labor 0615 2/50/-2     SROM 0400  0945 PELVIC EXAM: 4.5/ 80%/ Posterior/ soft/ -2    1105 6/-1       ASSESSMENT:  ==============  Elzbieta Maddoxalobos  24 year old  female  Estimated Date of Delivery: 2025  IUP @ 39w5d active labor and good progress   Normal fetal heart rate    Uterine Activity: normal uterine activity     GBS- negative    Patient Active Problem List   Diagnosis    Encounter for triage in pregnant patient    Anemia    Encounter for supervision of other normal pregnancy in third trimester    History of postpartum hemorrhage    Labor and delivery indication for care or intervention          PLAN:  ===========  comfort measures prn   Anticipate      FREDI Villanueva CNRAHEL   Medically Ready for Discharge: Anticipated Tomorrow

## 2025-01-17 NOTE — PLAN OF CARE
Patient tolerating labor well, walking in room and using birth ball, requesting nitrous for pain relief. Infant born with spontaneous cry,  to mother's abdomen. Dried and stimulated with warm blankets for further lusty cry. Delayed cord clamping, hat applied and moved to mother's chest, skin to skin. Breast feeding well on both breasts, was able to walk to bathroom and empty her bladder, laverne care done.

## 2025-01-17 NOTE — PLAN OF CARE
Data: Patient admitted to room 444 at 0501. Patient is a . Prenatal record reviewed.   OB History    Para Term  AB Living   2 1 1 0 0 1   SAB IAB Ectopic Multiple Live Births   0 0 0 0 1      # Outcome Date GA Lbr Abbe/2nd Weight Sex Type Anes PTL Lv   2 Current            1 Term 18     Vag-Spont   TC   .  Medical History: History reviewed. No pertinent past medical history..  Gestational age 39w5d. Vital signs per doc flowsheet. Fetal movement present. Patient reports Rule Out Labor (Patient stated their water broke at 0430. Bleeding. Discomfort. )   as reason for admission. Support persons, miguel Lugo.  Action: Verbal consent for EFM, external fetal monitors applied. Admission assessment completed. Patient and support persons educated on labor process. Patient instructed to report change in fetal movement, contractions, vaginal leaking of fluid or bleeding, abdominal pain, or any concerns related to the pregnancy to her nurse/physician. Patient oriented to room, call light in reach.   Response: Minda Forrest CNM informed of patient arrival. Plan per provider is to allow patient's labor to progress. SVE exam /-2. SROM at home at 0400, clear, moderate amount. Vitals WNL. Denies Pre-E symptoms. Patient verbalized understanding of education and verbalized agreement with plan.

## 2025-01-17 NOTE — PLAN OF CARE
Problem: Adult Inpatient Plan of Care  Goal: Optimal Comfort and Wellbeing  Intervention: Monitor Pain and Promote Comfort  Recent Flowsheet Documentation  Taken 1/17/2025 1523 by Julia Gtz RN  Pain Management Interventions: declines   Goal Outcome Evaluation:      Plan of Care Reviewed With: patient    Overall Patient Progress: improvingOverall Patient Progress: improving

## 2025-01-17 NOTE — H&P
ADMIT NOTE  =================  39w5d    Elzbieta Gooden is a 24 year old female with an Patient's last menstrual period was 2024. and Estimated Date of Delivery: 2025 is [facility] 2025 at 5:55 AM SROM x 2 hours.     HPI  ================  Patient presents with SROM @ 0400, clear fluid. Contractions have just begun to increase in intensity and frequency.     Contractions- irreg and moderate  Fetal movement- decreased since 0400  ROM- yes small, clear. SROM @ 0400.  Vaginal bleeding- none  GBS- unknown.   FOB- is involved, Parish     Weight gain- 155 - 132 lbs, Total weight gain- 23 lbs  Height- 4ft 9in  BMI- 28  First prenatal visit at 13+4 weeks, Total visits- 7    PROBLEM LIST  =================  Patient Active Problem List    Diagnosis Date Noted    Encounter for supervision of other normal pregnancy in third trimester 2025     Priority: Medium    History of postpartum hemorrhage 2025     Priority: Medium     Patient reports additional bleeding after delivery in 2018. Managed with medications, did not have blood transfusion       Encounter for triage in pregnant patient 2024     Priority: Medium    Anemia 2024     Priority: Medium       HISTORIES  ============  No Known Allergies  History reviewed. No pertinent past medical history.  History reviewed. No pertinent surgical history..  History reviewed. No pertinent family history.  Social History     Tobacco Use    Smoking status: Never    Smokeless tobacco: Never   Substance Use Topics    Alcohol use: Never     OB History    Para Term  AB Living   2 1 1 0 0 1   SAB IAB Ectopic Multiple Live Births   0 0 0 0 1      # Outcome Date GA Lbr Abbe/2nd Weight Sex Type Anes PTL Lv   2 Current            1 Term 18     Vag-Spont   TC        LABS:   ===========  Prenatal Labs:  Rhogam not indicated   O+, antibody negative   Rubella immune   Hep B antibody -  non-immune  Hep BsAg - NR  Hep C antibody - NR  RPR - NR  Varicella immune  HIV - NR  Hgb A1c 4.9  UC negative   Hemoglobin 9.1 (12/16/24)      GBS unknown.    Other labs:  COVID-19 PCR Results           No data to display              COVID-19 Antibody Results, Testing for Immunity           No data to display               No results found for this or any previous visit (from the past 24 hours).    ROS  =========  Pt denies significant respiratory, cardiovacular, GI, or muscular/skeletalcomplaints.    See RN data base ROS.       PHYSICAL EXAM:  ===============  /55 (BP Location: Left arm, Patient Position: Semi-Bateman's, Cuff Size: Adult Regular)   Temp 98.8  F (37.1  C) (Oral)   Resp 16   LMP 04/14/2024   General appearance: uncomfortable with contractions  GENERAL APPEARANCE: healthy, alert and no distress  RESP: even, unlabored breathing  CV: well perfused   ABDOMEN:  soft, nontender, no epigastric pain  SKIN: no suspicious lesions or rashes  NEURO: Denies headache, blurred vision, other vision changes  PSYCH: mentation appears normal. and affect normal/bright  Legs: No edema     Abdomen: gravid, vertex fetus per Leopold's, non-tender between contractions.   Cephalic presentation confirmed by vaginal exam  EFW-  6 lbs.   CONTRACTIONS: every 3-5 minutes  FETAL HEART TONES: continuous EFM- baseline 130 with moderate variability and positive accelerations. No decelerations.  PELVIC EXAM: 2/ 50%/ Mid/ average/ -2   ACOSTA SCORE: 5  BLOODY SHOW: no   ROM:yes small, clear. SROM @ 0400  FLUID: clear  ROMPLUS: not done    ASSESSMENT:  ==============  IUP @ 39w5d admitted in early labor, SROM   NST REACTIVE  Fetal Heart Tones - category one  GBS- unknown. Treat if >18 hours ruptured     Patient Active Problem List   Diagnosis    Encounter for triage in pregnant patient    Anemia    Encounter for supervision of other normal pregnancy in third trimester    History of postpartum hemorrhage        PLAN:  ===========  Admit - see IP orders  - IV placed due to PPH history reported by patient.   pain medication options of nitrous oxide, fentanyl IV and epidural anesthesia reviewed with pt. Pt is interested in un-medicated delivery.   Ambulation, hydration, position changes, birthing ball and tub options to facilitate labor reviewed with pt .  Anticipate   Minda Forrest, FREDI AGUILAR    An  was present (in-person/by phone/virtually) throughout the visit today. After discussing my assessment and treatment recommendations including risks and benefits with the patient/ patient s family, I asked the patient / family to convey what they understood about my assessment and recommendations to ensure understanding. The communication back from the patient/ family /parent, as relayed through the , confirmed understanding. The patient /family was also given time to have all questions answered.

## 2025-01-17 NOTE — PROGRESS NOTES
Labor Progress Note   An  was present  Video throughout the visit today. After discussing my assessment and treatment recommendations including risks and benefits with the patient, and family.  I asked the patient, and family to convey what they understood about my assessment and recommendations to ensure understanding. The communication back from the patient, and family, as relayed through the , confirmed understanding. Thepatient, and family was also given time to have all questions answered.   name: ,  ID number:  did not record    SUBJECTIVE:  ==============  Elzbieta Maddoxalobos  Estimated Date of Delivery: 2025  General appearance: uncomfortable with contractions  Support: Parish    Pt is up  walking  using ball offered tub/shower   Requesting cx exam  declines pain relief   OBJECTIVE:  ==============  VITALS  Blood pressure 116/63, pulse 82, temperature 98.2  F (36.8  C), temperature source Oral, resp. rate 18, last menstrual period 2024.  Patient Vitals for the past 24 hrs:   BP Temp Temp src Pulse Resp   25 0900 116/63 98.2  F (36.8  C) Oral 82 18   25 0517 113/55 98.8  F (37.1  C) Oral -- 16       FETAL HEART RATE ASSESSMENT: by IA   Baseline rate 140, normal  Variability audible accel     Decelerations not present    CONTRACTIONS: Contractions every 2-3 minutes.  Palpate: moderate  Pitocin- none,  Antibiotics- none    ROM: clear fluid  PELVIC EXAM: 4.5/ 80%/ Posterior/ soft/ -2     # Pain Assessment:      2025     6:34 AM   Current Pain Score   Patient currently in pain? yes   - Elzbieta is experiencing pain due to labor. Pain management was discussed with Elzbieta and her significant other and the plan was created in a collaborative fashion.  Elzbieta's response to the current recommendations: engaged  Pt plans unmedicated       FETAL HEART RATE ASSESSMENT:  Reviewed fetal monitoring  IA  at bedside  PAUL  interpretation suggests: absence of concern for metabolic acidemia due to: presence of accelerations. EFM suggests no concern for interruption of the oxygen pathway..    Labor course:  Pt admitted in early labor 0615 2/50/-2     SROM 0400  0945 PELVIC EXAM: 4.5/ 80%/ Posterior/ soft/ -2      ASSESSMENT:  ==============  Elzbieta Jojo Gooden  24 year old  female  Estimated Date of Delivery: 2025  IUP @ 39w5d in early labor   Fetal Heart Rate Tracing category one   IA   GBS- not done    Patient Active Problem List   Diagnosis    Encounter for triage in pregnant patient    Anemia    Encounter for supervision of other normal pregnancy in third trimester    History of postpartum hemorrhage    Labor and delivery indication for care or intervention        PLAN:  ===========  - Anticipate progress and NSVB.   - Reevaluate progress in 2 hours or sooner with a change in status.  - Encouraged frequent position changes to facilitate labor and fetal descent.  -  Clinically Significant Risk Factors Present on Admission                                      Medically Ready for Discharge: Anticipated in 2-4 Days     FREDI Villanueva CNM

## 2025-01-18 ENCOUNTER — VIRTUAL VISIT (OUTPATIENT)
Dept: INTERPRETER SERVICES | Facility: CLINIC | Age: 25
End: 2025-01-18
Payer: COMMERCIAL

## 2025-01-18 ENCOUNTER — OFFICE VISIT (OUTPATIENT)
Dept: INTERPRETER SERVICES | Facility: CLINIC | Age: 25
End: 2025-01-18
Payer: COMMERCIAL

## 2025-01-18 VITALS
TEMPERATURE: 98.3 F | BODY MASS INDEX: 30.37 KG/M2 | HEART RATE: 76 BPM | WEIGHT: 144.7 LBS | SYSTOLIC BLOOD PRESSURE: 106 MMHG | DIASTOLIC BLOOD PRESSURE: 55 MMHG | RESPIRATION RATE: 16 BRPM

## 2025-01-18 PROBLEM — Z30.017 NEXPLANON INSERTION: Status: ACTIVE | Noted: 2025-01-18

## 2025-01-18 PROBLEM — Z36.89 ENCOUNTER FOR TRIAGE IN PREGNANT PATIENT: Status: RESOLVED | Noted: 2024-12-30 | Resolved: 2025-01-18

## 2025-01-18 PROBLEM — D64.9 ANEMIA: Status: RESOLVED | Noted: 2024-09-12 | Resolved: 2025-01-18

## 2025-01-18 LAB
HGB BLD-MCNC: 10.4 G/DL (ref 11.7–15.7)
HOLD SPECIMEN: NORMAL

## 2025-01-18 PROCEDURE — 250N000011 HC RX IP 250 OP 636: Performed by: ADVANCED PRACTICE MIDWIFE

## 2025-01-18 PROCEDURE — 11981 INSERTION DRUG DLVR IMPLANT: CPT | Mod: 79 | Performed by: ADVANCED PRACTICE MIDWIFE

## 2025-01-18 PROCEDURE — T1013 SIGN LANG/ORAL INTERPRETER: HCPCS | Mod: GT,TEL,95

## 2025-01-18 PROCEDURE — 0JHF3HZ INSERTION OF CONTRACEPTIVE DEVICE INTO LEFT UPPER ARM SUBCUTANEOUS TISSUE AND FASCIA, PERCUTANEOUS APPROACH: ICD-10-PCS | Performed by: ADVANCED PRACTICE MIDWIFE

## 2025-01-18 PROCEDURE — 85018 HEMOGLOBIN: CPT | Performed by: MIDWIFE

## 2025-01-18 PROCEDURE — 99238 HOSP IP/OBS DSCHRG MGMT 30/<: CPT | Performed by: ADVANCED PRACTICE MIDWIFE

## 2025-01-18 PROCEDURE — 36415 COLL VENOUS BLD VENIPUNCTURE: CPT | Performed by: MIDWIFE

## 2025-01-18 PROCEDURE — 250N000009 HC RX 250: Performed by: ADVANCED PRACTICE MIDWIFE

## 2025-01-18 PROCEDURE — T1013 SIGN LANG/ORAL INTERPRETER: HCPCS

## 2025-01-18 RX ADMIN — ETONOGESTREL 68 MG: 68 IMPLANT SUBCUTANEOUS at 11:06

## 2025-01-18 RX ADMIN — LIDOCAINE HYDROCHLORIDE 2 ML: 10 INJECTION, SOLUTION EPIDURAL; INFILTRATION; INTRACAUDAL; PERINEURAL at 11:08

## 2025-01-18 ASSESSMENT — ACTIVITIES OF DAILY LIVING (ADL)
ADLS_ACUITY_SCORE: 20

## 2025-01-18 NOTE — PLAN OF CARE
Problem: Adult Inpatient Plan of Care  Goal: Optimal Comfort and Wellbeing  Intervention: Monitor Pain and Promote Comfort  Recent Flowsheet Documentation  Taken 2025 by Julia Rodrigues RN  Pain Management Interventions: declines   Goal Outcome Evaluation:      Plan of Care Reviewed With: patient, spouse    Overall Patient Progress: improvingOverall Patient Progress: improving       Day delivery  doing well. VSS. OB checks WNL. Voiding normal. Up ad nicolas independently. Breastfeeding  independently. History of breastfeeding her daughter for 4 years. Pt denies pain when asked. Has not needed pain medication, is aware it is available anytime. Resting in bed comfortably overnight. No concerns. Continue with cares.

## 2025-01-18 NOTE — PLAN OF CARE
Problem: Adult Inpatient Plan of Care  Goal: Optimal Comfort and Wellbeing  Outcome: Progressing  Intervention: Monitor Pain and Promote Comfort  Recent Flowsheet Documentation  Taken 1/18/2025 0853 by Julia Gtz, RN  Pain Management Interventions:   heat applied   medication offered but refused  Intervention: Provide Person-Centered Care  Recent Flowsheet Documentation  Taken 1/18/2025 0900 by Julia Gtz, RN  Trust Relationship/Rapport:   care explained   choices provided   questions answered   questions encouraged   reassurance provided   Goal Outcome Evaluation:      Plan of Care Reviewed With: patient    Overall Patient Progress: improvingOverall Patient Progress: improving

## 2025-01-18 NOTE — DISCHARGE SUMMARY
[facility] Discharge Summary    Elzbieta Gooden MRN# [id number]   Age: 24 year old YOB: 2000     Date of Admission:  2025  Date of Discharge::  2025  Admitting Provider:  FREDI Tidwell CNM  Discharge Provider:  Estela Hsieh CNM, LAUREN, MS    An  was present  Video throughout the visit today. After discussing my assessment and treatment recommendations including risks and benefits with the patient.  I asked the patient to convey what they understood about my assessment and recommendations to ensure understanding. The communication back from the patient, as relayed through the , confirmed understanding. Thepatient was also given time to have all questions answered.   name: Brian  ID number: [id number]      Home clinic: [facility]          Admission Diagnoses:   Labor and delivery indication for care or intervention [O75.9]          Discharge Diagnosis:     Normal spontaneous vaginal delivery  Intrauterine pregnancy at 39 weeks gestation  Shoulder dystocia x35 seconds          Procedures:     Procedure(s): Nexplanon insertion L arm                Medications Prior to Admission:     Medications Prior to Admission   Medication Sig Dispense Refill Last Dose/Taking    ferrous gluconate (FERGON) 324 (38 Fe) MG tablet Take 324 mg by mouth daily (with breakfast).                Discharge Medications:   No medications were prescribed on discharge          Consultations:   No consultations were requested during this admission          Brief History of Labor:   Delivery Course:  Elzbieta Gooden is a 24 year old   at  39w5d presented to labor floor with c/o SROM at 04 and contractions .    Brief labor course:   Pt admitted in early labor 0615 2/50/-2     SROM 0400  0945 PELVIC EXAM: 4.5/ 80%/ Posterior/ soft/ -2    1105 6/90/-1   1207 9cm and pushy     1209 ant lip reduced easily with pt  pushing   1214 FHR 80 noted   Dr Jose called to come  CNM called for NICU attendance at birth   1216 FHR back to 120s pt pushing well 1218 G3 in room 1220 Dr Jose in room   Pt pushing well    IUP at 39 weeks gestation delivered on 2025.     Labor was spontaneous.  Medications administered  in labor:  Pain Rx Nitrous oxide ; Antibiotics No;   25  0400 date/time of rupture: Progressed to complete 1209  .  Pushed effectively . FHR cat 2   Head delivered,. 12:21. 25 sec,   shoulder dystocia noted with ant flexion   MD in room    Pt placed in Brian with suprapubic  pressure  shoulders delivered with ant traction   no nuchal cord.    35 sec from head to baby delivery    birth of a viable Female infant at 1222      Spontaneous breath, vigorous cry, well flexed, HR>100. Infant directly to maternal abdomen, skin to skin.   Delayed cord clamping for 2 minutes then clamped x2 and cut by CUCO Lugo .    Cord blood obtained for typing.  Cord segment for gases.    Apgars of 8 at 1 minute and 9 at 5 minutes.  Placenta-mechanism: spontaneous, intact,  with a 3 vessel cord. IV oxytocin was given. at 1226.   .   Fundus firm @ U/2 after IV pitocin opened after delivery of infant,  Methergine 0.2 mg IM given followed by miso 800 mcg rectal following a small gush   Fundal massage no further clots or heavy bleeding .     Vagina, perineum, and rectum inspected, Perineum: Intact.        Mother and infant stable, continued skin to skin.   Weight : 8 pounds 1 ounces   QBLs was 350.  Complications of pregnancy, labor and delivery: hx PPH per pt   Anticipated d/c date: 25  Birth attendants:FREDI Villanueva CNRAHEL     Assessment Day of Discharge    Pt stable, baby is rooming in  Breast feeding status:initiated  Complications since 2 hours post delivery: None  Patient is tolerating acitivity well Voiding without difficulty, cramping managed with meds, lochia is decreasing and patient denies clots.  Perineal  pain is managed with meds.    postpartum exam   Breasts: deferred  Abdomen: soft, nontender, fundus firm, umb, midline  Perineum:  deferred  Legs: nontender, trace edema    Patient Vitals for the past 24 hrs:   BP Temp Temp src Pulse Resp   01/18/25 0415 97/57 98.7  F (37.1  C) Oral 60 16   01/18/25 0000 109/61 98.1  F (36.7  C) Oral 89 16   01/17/25 2000 107/63 98.7  F (37.1  C) Oral -- 16   01/17/25 1523 129/78 98.8  F (37.1  C) Oral 97 16   01/17/25 1400 128/62 -- -- -- --   01/17/25 1330 107/52 -- -- -- --   01/17/25 1315 117/52 98.4  F (36.9  C) Oral -- --   01/17/25 1300 130/70 -- -- -- --   01/17/25 1234 119/68 -- -- -- --   01/17/25 1230 125/72 -- -- 110 --   01/17/25 1030 -- 98.1  F (36.7  C) Oral -- --   01/17/25 0900 116/63 98.2  F (36.8  C) Oral 82 18                Hospital Course:   The patient's hospital course was unremarkable.  On discharge, her pain was well controlled. Vaginal bleeding is decreasing and WNL.  Voiding without difficulty.  Ambulating well and tolerating a normal diet.  No fever.  Breastfeeding going well.  Had a BM since delivery without issue. Infant is stable. She was discharged on post-partum day #1.    Post-partum hemoglobin:   Hemoglobin   Date Value Ref Range Status   01/17/2025 11.2 (L) 11.7 - 15.7 g/dL Final      ASSESSMENT/PLAN:  Patient Active Problem List    Encounter for supervision of other normal pregnancy in third trimester         Priority: Medium [2]         Date Noted: 01/17/2025      History of postpartum hemorrhage         Priority: Medium [2]         Date Noted: 01/17/2025            Patient reports additional bleeding after delivery            in 2018. Managed with             medications, did not have blood transfusion       Labor and delivery indication for care or intervention         Priority: Medium [2]         Date Noted: 01/17/2025      Encounter for triage in pregnant patient         Priority: Medium [2]         Date Noted: 12/30/2024      Anemia          Priority: Medium [2]         Date Noted: 09/12/2024        Stable Post-partum day #1  Complications: none  Plan d/c home today  RTC 2&6wks  Teaching done: Birth Control Options, Warning Signs/When to Call: Excessive Bleeding, Infection, PP Depression, RTC Clinic for PP Appointment, and PNV    Postpartum warning s/s reviewed, including bleeding/clots, fever, mastitis, or depression    Birthcontrol planned: Nexplanon inserted today in L arm- see separate procedure note  Current Discharge Medication List        CONTINUE these medications which have NOT CHANGED    Details   ferrous gluconate (FERGON) 324 (38 Fe) MG tablet Take 324 mg by mouth daily (with breakfast).                  Discharge Instructions and Follow-Up:     Discharge diet: Regular   Discharge activity: Activity as tolerated   Discharge follow-up: In 2 weeks-either RN phone or in person visit if desires, then 6-8 wks CNRAHEL               Discharge Disposition:     Discharged to home   Follow up with  [facility]  for routine postpartum visit in 2 weeks (phone or in person) and 6 weeks    Estela Hsieh CNM

## 2025-01-18 NOTE — LACTATION NOTE
This note was copied from a baby's chart.  Consult for:  patient request   Consult done via  at bedside    Infant Name: Arlene    Infant's Primary Care Clinic: [facility]    Delivery Information:  Arlene was born at Gestational Age: 39w5d via vaginal delivery on 2025 12:22 PM     Maternal Health History:  Maternal past medical history, problem list and prior to admission medications reviewed and unremarkable.    Maternal Breast Exam:  Elzbieta noted breast growth and sensitivity in early pregnancy. She denies any history of breast/chest injury or surgery. No visual breast exam done at this visit. She has been able to hand express colostrum. ?    Breastfeeding/ Lactation History: Elzbieta shares that she  her first child, who is 5 years old, without difficulty.    Infant information: Arlene was AGA at birth and has age appropriate output. 21 hours old at time of consult.    Weight Change Since Birth: 24 hour weight not yet assessed.    Oral exam of baby:  No oral exam done at this visit.    Feeding History: Elzbieta reports that breastfeeding is going well and that the latch is comfortable. She shares that she feels like her milk is not in yet. Discussed stages of milk production and reviewed normal feeding patterns and how to know if baby is getting enough milk.    Feeding Assessment:  No feeding assessment done as infant was sleeping in bassinet throughout visit.    Education:   [x] Expected  feeding patterns in the first few days (pg. 38 of Your Guide to To Postpartum and  Care)/ the Second Night  [x] Stages of milk production  [x] Benefits of hand expression of colostrum   [x] Benefits of feeding on cue  [x] How to tell if baby is getting enough  [x] Expected  output  [x] [place] weight loss  [x] Infant Feeding Log  [x] [employer] Breastfeeding/Pumping videos  [x] Signs breastfeeding is going well (comfortable latch, audible swallows, age appropriate  output and weight loss)    [x] Inpatient breastfeeding support  [x] Outpatient lactation resources    Handouts: Infant Feeding Log (Week 1, Your Guide to Postpartum &  Care Book) and [facility] Lactation Resources    Home Breast Pump: has pump at home    Plan: Continue breastfeeding on cue with RN support as needed, goal of 8-12 feedings per day.     Encourage frequent skin to skin and hand expression.     Encouraged follow up with outpatient lactation consultant  as needed after discharge. Family plans to follow up with [facility].      Kaye Santos RN, IBCLC   Lactation Consultant  [place]: [facility] [phone]  Office: [phone]

## 2025-01-18 NOTE — PROGRESS NOTES
"Summary:  orlando reports cramping as \"5\" offered prn meds - declines.  Accepting of heat.  Requested LC support.  RN to assist as well - pt states NB not latching very long.  Reviewed norms.  VSS.  Assessments WNL.  Declines colace.  EDS completed - Malian version.  Working on BC.  ROP given.  Meals ordered.  Hgb 10.4.  no dizziness reported.  Voiding without difficulty.  Parish supportive at bedside.  Seen by LAUREN Hsieh.  Plan for nexplanon insertion when avail.  Call light is within reach for assistance.   [place]alian intrepreter used throughout.    "

## 2025-01-18 NOTE — PROGRESS NOTES
Summary:  pt had nexplanon placed by Jori AGUILAR this morning - consent verified prior to placement. States understanding of care and when to seek care.  Comfortable today and declined medications offered.  Reviewed when to seek care, follow up, home care and resources - Elzbieta states understanding of all.  States belongings with her.  NB bands checked/matched.  Admit abuse screen completed while pt alone.   here throughout.  Discharge to home comfortable and stable at 1607 with Parish and NB.  Escorted from hospital with transport.

## 2025-01-18 NOTE — DISCHARGE INSTRUCTIONS
Warning Signs after Having a Baby    Keep this paper on your fridge or somewhere else where you can see it.    Call your provider if you have any of these symptoms up to 12 weeks after having your baby.    Thoughts of hurting yourself or your baby  Pain in your chest or trouble breathing  Severe headache not helped by pain medicine  Eyesight concerns (blurry vision, seeing spots or flashes of light, other changes to eyesight)  Fainting, shaking or other signs of a seizure    Call 9-1-1 if you feel that it is an emergency.     The symptoms below can happen to anyone after giving birth. They can be very serious. Call your provider if you have any of these warning signs.    My provider s phone number: _______________________    Losing too much blood (hemorrhage)    Call your provider if you soak through a pad in less than an hour or pass blood clots bigger than a golf ball. These may be signs that you are bleeding too much.    Blood clots in the legs or lungs    After you give birth, your body naturally clots its blood to help prevent blood loss. Sometimes this increased clotting can happen in other areas of the body, like the legs or lungs. This can block your blood flow and be very dangerous.     Call your provider if you:  Have a red, swollen spot on the back of your leg that is warm or painful when you touch it.   Are coughing up blood.     Infection    Call your provider if you have any of these symptoms:  Fever of 100.4 F (38 C) or higher.  Pain or redness around your stitches if you had an incision.   Any yellow, white, or green fluid coming from places where you had stitches or surgery.    Mood Problems (postpartum depression)    Many people feel sad or have mood changes after having a baby. But for some people, these mood swings are worse.     Call your provider right away if you feel so anxious or nervous that you can't care for yourself or your baby.    Preeclampsia (high blood pressure)    Even if you  didn't have high blood pressure when you were pregnant, you are at risk for the high blood pressure disease called preeclampsia. This risk can last up to 12 weeks after giving birth.     Call your provider if you have:   Pain on your right side under your rib cage  Sudden swelling in the hands and face    Remember: You know your body. If something doesn't feel right, get medical help.     For informational purposes only. Not to replace the advice of your health care provider. Copyright 2020 [employer]. All rights reserved. Clinically reviewed by Yaritza Little, RNC-OB, MSN. [employer] [id number] - Rev 02/23.    Después del parto (período de posparto): Instrucciones de cuidado  After Your Delivery (the Postpartum Period): Care Instructions  Instrucciones de cuidado     Felicidades por el nacimiento de crowder bebé. Al igual que el embarazo, el tiempo con el recién nacido puede ser un momento de entusiasmo, alegría y agotamiento. Es posible que se sienta huerta al mirar la mary de crowder pequeño bebé. También podría sentirse abrumada por crowder nuevo ritmo de sueño y las nuevas responsabilidades.  Al principio, los bebés suelen dormir cipriano el día y permanecen despiertos cipriano la noche. No tienen ningún patrón ni rutina. Podrían marisa gritos ahogados, sacudirse y despertarse, o parecer vikki si tuvieran los ojos cruzados (bizcos). Todo esto es normal, e incluso la pueden hacer sonreír.  Cipriano las primeras semanas siguientes al parto, trate de cuidarse prasanth. Podría tardar de 4 a 6 semanas en volver a sentirse usted misma, y posiblemente más tiempo si le castañeda hecho camilo cesárea. Es probable que se sienta muy fatigada cipriano varias semanas. Concepcion días estarán llenos de altibajos, navneet también de mucha alegría.  La atención de seguimiento es camilo parte clave de crowder tratamiento y seguridad. Asegúrese de hacer y acudir a todas las citas, y llame a crowder médico si está teniendo problemas. También es camilo buena idea saber los  "resultados de jt exámenes y mantener camilo lista de los medicamentos que maykel.   Cómo puede cuidarse en el hogar?  Cuide crowder cuerpo después del parto  Utilice toallas sanitarias en vez de tampones para las pérdidas de geena que podrían durar hasta 2 semanas.  Alivie los cólicos con ibuprofeno (Advil, Motrin).  Alivie el dolor de las hemorroides y la lee entre la vagina y el recto con compresas de hielo o de infusión de hamamelis virginiana (\"witch hazel\").  Alivie el estreñimiento bebiendo mucho líquido y comiendo alimentos ricos en fibra. Pregúntele a crowder médico acerca de los ablandadores de heces de venta kisha.  Límpiese con un chorrito suave de agua tibia de camilo botella en vez de hacerlo con papel higiénico.  [place] un baño de asiento en agua tibia varias veces al día.  Use un buen sostén de lactancia. Alivie el dolor y la hinchazón de los senos con toallitas de aseo húmedas tibias.  Si no está amamantando, use hielo en vez de calor para la sensibilidad en los senos.  Si está amamantando, crowder período menstrual podría no comenzar hasta después de varios meses. Es posible que, al principio, geena más y más tiempo de lo que lo hacía antes del embarazo.  Espere a que haya sanado (alrededor de 4 a 6 semanas) antes de tener relaciones sexuales. Pregúntele a crowder médico cuándo está prasanth que tenga relaciones sexuales.  Trate de no viajar con el bebé cipriano las primeras 5 o 6 semanas. Si hace un viaje briana en automóvil, nataliia paradas frecuentes para caminar y estirarse.  Evite el agotamiento  Descanse todos los días. Trate de dormir la siesta cuando crowder bebé también lo nataliia.  Pídale a otro adulto que la acompañe por unos días después del parto.  Planifique el cuidado de los niños si tiene otros hijos.  Sea flexible para que pueda comer a horas fuera de lo común y dormir cuando lo necesite. Tanto usted vikki crowder bebé están creando horarios nuevos.  Planee pequeñas salidas para estar fuera de casa. El cambio podría hacer que se " "sienta menos fatigada.  Pida ayuda para cocinar y hacer las tareas del hogar y las compras. Recuerde que crowder principal tarea consiste en cuidar a crowder bebé.  Sepa qué ayuda puede recibir en topher de tener depresión posparto  La \"melancolía de la maternidad\" es común cipriano las primeras 1 a 2 semanas después del parto. Usted podría llorar o sentirse celsa o irritable sin motivo.  Descanse cada vez que pueda hacerlo. Estar fatigada dificulta manejar las emociones.  Salga a caminar con crowder bebé.  Hable con crowder jerome, jt amigos y jt familiares acerca de jt sentimientos.  Si jt síntomas bronson más de unas pocas semanas, o si se siente muy deprimida, pídale ayuda a crowder médico.  La depresión posparto puede tratarse. Los grupos de apoyo y la asesoría psicológica pueden ser útiles. A veces, los medicamentos también pueden ayudar.  Manténgase saludable  Coma alimentos saludables para tener más energía.  Si amamanta, evite las drogas. Si dejó de fumar cipriano el embarazo, trate de no volver a fumar. Si opta por ramakrishna camilo bebida alcohólica de vez en cuando, solo tome camilo bebida y limite la cantidad de ocasiones en que carlos alcohol. Después de beber alcohol, espere al menos 2 horas antes de amamantar para reducir la cantidad de alcohol que el bebé pueda recibir a través de la leche.  Comience a hacer ejercicios diarios después de 4 a 6 semanas, navneet descanse cuando se sienta fatigada.  Aprenda ejercicios para tonificar el abdomen. Pruebe los ejercicios de Kegel para recuperar la fuerza en los músculos pélvicos. Usted puede hacer estos ejercicios mientras está de pie o en posición sentada. (Si hacer estos ejercicios causa dolor, deje de hacerlos y hable con crowder médico).  Contraiga los músculos vikki si estuviera tratando de no ventosear. O contraiga los músculos vikki si estuviera interrumpiendo un chorro de orina. Debe tener inmóviles el abdomen, las piernas y las nalgas.  Mantenga la contracción por 3 segundos, y luego relaje por " entre 5 y 10 segundos.  Comience con 3 segundos, luego añada 1 andrew cada semana hasta que pueda contraer por 10 segundos.  Repita el ejercicio 10 veces por sesión. Nguyễn de 3 a 8 sesiones al día.  Encuentre camilo clase para usted y crowder bebé que tenga un tiempo de ejercicio.  Si le castañeda hecho camilo cesárea, dese un poco más de tiempo antes de hacer ejercicio, y tenga cuidado.   Cuándo debe pedir ayuda?   Llame al 911  en cualquier momento que considere que necesita atención de urgencia. Por ejemplo, llame si:    Tiene pensamientos de lastimarse o de hacerle daño a crowder bebé o a otra persona.     Se desmayó (perdió el conocimiento).     Tiene dolor en el pecho, le falta el aire o tose geena.     Tiene convulsiones.   Llame a crowder médico ahora mismo o busque atención médica de inmediato si:    Tiene sangrado vaginal intenso. Blanco significa que está expulsando coágulos sanguíneos y empapando camilo toalla sanitaria cada hora por 2 horas o más.     Está mareada o aturdida, o siente vikki que se puede desmayar.     Tiene fiebre.     Tiene dolor abdominal nuevo o más intenso.     Tiene señales de un coágulo de geena en la pierna (que se llama trombosis venosa profunda), vikki:  Dolor en la pantorrilla, el muslo, la kalyan o detrás de la rodilla.  Enrojecimiento e hinchazón en la pierna o la kalyan.     Tiene señales de preeclampsia, vikki:  Hinchazón repentina de la milla, las ev o los pies.  Nuevos problemas de la vista (vikki oscurecimiento, kike borroso o kike puntos).  Dolor de sarkis intenso.   Preste especial atención a los cambios en crowder judith y asegúrese de comunicarse con crowder médico si:    Crowder sangrado vaginal parece volverse más intenso.     Tiene flujo vaginal nuevo o que empeora.     Se siente celsa, ansiosa o sin esperanzas cipriano más de unos pocos días.     No mejora vikki se esperaba.    Dónde puede encontrar más información en inglés?  Vaya a [email or web address]  Escriba A461 en la búsqueda para  "aprender más acerca de \"Después del parto (período de posparto): Instrucciones de cuidado.\"  Revisado: 30 abril, 2024  Versión del contenido: 14.3    2024 [employer].   Las instrucciones de cuidado fueron adaptadas bajo licencia por crowder profesional de atención médica. Si usted tiene preguntas sobre camilo afección médica o sobre estas instrucciones, siempre pregunte a crowder profesional de judith. [employer] niega toda garantía o responsabilidad por crowder uso de esta información.    "

## 2025-01-18 NOTE — PROCEDURES
Chief Complaint:  Nexplanon Insertion   Subjective:  Patient is a 24 year old  who presents for Nexplanon insertion.   Written and verbal informed consent obtained, risks discussed included:  bleeding, irregular menses, infection, pain/discomfort, cost for removal as well as alternative contraception options.    Pt has no allergy to betadine or lidocaine.  Last sexual activity - N/A, immediate postpartum. Reviewed possibility to affect milk supply theoretically when inserted immediately postpartum- pt verbalizes understanding and desires insertion today. Patient's last menstrual period was 2024.    An  was present  In Person throughout the visit today. After discussing my assessment and treatment recommendations including risks and benefits with the patient.  I asked the patient to convey what they understood about my assessment and recommendations to ensure understanding. The communication back from the patient, as relayed through the , confirmed understanding. Thepatient was also given time to have all questions answered.   name: Rozina      Patient Active Problem List    Diagnosis Date Noted    Encounter for supervision of other normal pregnancy in third trimester 2025     Priority: Medium    History of postpartum hemorrhage 2025     Priority: Medium     Patient reports additional bleeding after delivery in 2018. Managed with medications, did not have blood transfusion       Labor and delivery indication for care or intervention 2025     Priority: Medium    Encounter for triage in pregnant patient 2024     Priority: Medium    Anemia 2024     Priority: Medium     History reviewed. No pertinent past medical history.   History reviewed. No pertinent surgical history.   Medications Prior to Admission   Medication Sig Dispense Refill Last Dose/Taking    ferrous gluconate (FERGON) 324 (38 Fe) MG tablet Take 324 mg by mouth daily (with  breakfast).        No Known Allergies   Social History     Tobacco Use    Smoking status: Never    Smokeless tobacco: Never   Substance Use Topics    Alcohol use: Never      History reviewed. No pertinent family history.   Review of Systems  Pertinent items are noted in HPI.      Objective:  Vitals:    25 1523 25 2000 25 0000 25 0415   BP: 129/78 107/63 109/61 97/57   BP Location: Left arm  Left arm Left arm   Patient Position: Semi-Bateman's  Sitting Sitting   Cuff Size: Adult Regular  Adult Regular Adult Regular   Pulse: 97  89 60   Resp: 16 16 16 16   Temp: 98.8  F (37.1  C) 98.7  F (37.1  C) 98.1  F (36.7  C) 98.7  F (37.1  C)   TempSrc: Oral Oral Oral Oral     UPT - N/A    Patient presents for placement of Nexplanon for contraception.  She has had been counseled on side effects, risks, benefits and alternatives.  Patient desires to proceed.    Verification of Procedure:  Just before the procedure begans through verbal and active participation of team members, I verified:     Initials   Patient Name Elzbieta Gooden   Patient  89   Procedure to be performed Nexplanon insertion L arm        Procedure: Nexplanon Placement into Left Arm  Indication:  Desired contraception    Pt. in supine position with Left arm bent at 90 degrees.  A time out was performed.   Position for insertion identified on upper arm 8 cm from medial epicondyl. Marks at insertion site and 4cm beyond placed.  Area prepped with Betadine which was allowed to dry. 2cc of 1% lidocaine injected just under the skin along the planned insertion tunnel. Nexplanon LOT # [id number], Exp date 2027 placed subdermally under sterile conditions without difficulty. Teddy in place and confirmed via palpation by provider, pt declined to palpate.  Pressure held, sterile bandage placed with steri strips.  Small amount of bleeding noted at insertion site and mild bruising noted along track of Nexplanon.   Left upper arm  wrapped in pressure dressing.  Patient tolerated procedure well.   Complications:  none  EBL:  < 1 mL    Assessment/Plan:  - Nexplanon insertion   - Routine return precautions (bleeding, redness, pain, concern for infection) discussed.  Pt. given card with date of insertion, date of removal by 1/18/2030   - Pt counseled to avoid intercourse for the first 6 weeks of postpartum period   - May take Ibuprofen/Tylenol prn for discomfort.    - RTO prn if questions or concerns    Over 50% of the 30 minute visit was spent in face to face counseling as stated above. All patient's questions were discussed and answered. Pt agreed to plan of care.    Estela Hsieh CNM

## 2025-01-21 ENCOUNTER — MEDICAL CORRESPONDENCE (OUTPATIENT)
Dept: HEALTH INFORMATION MANAGEMENT | Facility: CLINIC | Age: 25
End: 2025-01-21
Payer: COMMERCIAL